# Patient Record
Sex: MALE | Race: WHITE | NOT HISPANIC OR LATINO | Employment: FULL TIME | URBAN - METROPOLITAN AREA
[De-identification: names, ages, dates, MRNs, and addresses within clinical notes are randomized per-mention and may not be internally consistent; named-entity substitution may affect disease eponyms.]

---

## 2020-10-23 ENCOUNTER — OFFICE VISIT (OUTPATIENT)
Dept: FAMILY MEDICINE | Facility: CLINIC | Age: 40
End: 2020-10-23
Payer: COMMERCIAL

## 2020-10-23 ENCOUNTER — TELEPHONE (OUTPATIENT)
Dept: FAMILY MEDICINE | Facility: CLINIC | Age: 40
End: 2020-10-23

## 2020-10-23 ENCOUNTER — OFFICE VISIT (OUTPATIENT)
Dept: URGENT CARE | Facility: CLINIC | Age: 40
End: 2020-10-23
Payer: COMMERCIAL

## 2020-10-23 VITALS
BODY MASS INDEX: 27.05 KG/M2 | RESPIRATION RATE: 18 BRPM | DIASTOLIC BLOOD PRESSURE: 70 MMHG | WEIGHT: 188.94 LBS | TEMPERATURE: 98 F | SYSTOLIC BLOOD PRESSURE: 118 MMHG | HEART RATE: 72 BPM | HEIGHT: 70 IN | OXYGEN SATURATION: 98 %

## 2020-10-23 DIAGNOSIS — J40 BRONCHITIS: Primary | ICD-10-CM

## 2020-10-23 DIAGNOSIS — Z20.9 RECENT EXPOSURE TO COMMUNICABLE DISEASE: ICD-10-CM

## 2020-10-23 PROCEDURE — 99999 PR PBB SHADOW E&M-NEW PATIENT-LVL III: CPT | Mod: PBBFAC,,, | Performed by: FAMILY MEDICINE

## 2020-10-23 PROCEDURE — 96372 PR INJECTION,THERAP/PROPH/DIAG2ST, IM OR SUBCUT: ICD-10-PCS | Mod: S$GLB,,, | Performed by: FAMILY MEDICINE

## 2020-10-23 PROCEDURE — 96372 THER/PROPH/DIAG INJ SC/IM: CPT | Mod: S$GLB,,, | Performed by: FAMILY MEDICINE

## 2020-10-23 PROCEDURE — 99203 OFFICE O/P NEW LOW 30 MIN: CPT | Mod: 25,S$GLB,, | Performed by: FAMILY MEDICINE

## 2020-10-23 PROCEDURE — 99999 PR PBB SHADOW E&M-NEW PATIENT-LVL III: ICD-10-PCS | Mod: PBBFAC,,, | Performed by: FAMILY MEDICINE

## 2020-10-23 PROCEDURE — 99203 PR OFFICE/OUTPT VISIT, NEW, LEVL III, 30-44 MIN: ICD-10-PCS | Mod: 25,S$GLB,, | Performed by: FAMILY MEDICINE

## 2020-10-23 PROCEDURE — 3008F PR BODY MASS INDEX (BMI) DOCUMENTED: ICD-10-PCS | Mod: CPTII,S$GLB,, | Performed by: FAMILY MEDICINE

## 2020-10-23 PROCEDURE — 3008F BODY MASS INDEX DOCD: CPT | Mod: CPTII,S$GLB,, | Performed by: FAMILY MEDICINE

## 2020-10-23 RX ORDER — BENZONATATE 200 MG/1
200 CAPSULE ORAL 3 TIMES DAILY PRN
Qty: 30 CAPSULE | Refills: 0 | Status: SHIPPED | OUTPATIENT
Start: 2020-10-23 | End: 2020-11-02

## 2020-10-23 RX ORDER — METHYLPREDNISOLONE SOD SUCC 125 MG
125 VIAL (EA) INJECTION ONCE
Status: COMPLETED | OUTPATIENT
Start: 2020-10-23 | End: 2020-10-23

## 2020-10-23 RX ORDER — IPRATROPIUM BROMIDE AND ALBUTEROL SULFATE 2.5; .5 MG/3ML; MG/3ML
3 SOLUTION RESPIRATORY (INHALATION)
Status: SHIPPED | OUTPATIENT
Start: 2020-10-23

## 2020-10-23 RX ORDER — AMOXICILLIN AND CLAVULANATE POTASSIUM 875; 125 MG/1; MG/1
1 TABLET, FILM COATED ORAL EVERY 12 HOURS
Qty: 14 TABLET | Refills: 0 | Status: SHIPPED | OUTPATIENT
Start: 2020-10-23 | End: 2020-10-30

## 2020-10-23 RX ADMIN — Medication 125 MG: at 02:10

## 2020-10-23 NOTE — TELEPHONE ENCOUNTER
Pt wanted to know if he need a covid test before coming to the office. Pt states he having sinus  problem that combine with covid symptoms

## 2020-10-23 NOTE — TELEPHONE ENCOUNTER
----- Message from Whitney Holly sent at 10/23/2020 11:29 AM CDT -----  Contact: Patient [phone]  Type: Patient Call Back    Who called:Patient    What is the request in detail: Need to speak to nurse in regards to 2 pm appointment today with Dr Will and COVID Testing he's scheduled too have done before seeing Dr Will. Wondering if he has to get the COVID Test before seeing Dr Will? States he's been there for an hour waiting to get the test done. Wife tested negative. Please call pt ASAP!     Would the patient rather a call back or a response via [facility]? Call back    Best call back number: [phone]

## 2020-10-25 NOTE — PROGRESS NOTES
"Subjective:       Patient ID: Rito Flores is a 40 y.o. male.    Chief Complaint: Cough and Sore Throat    Cough  This is a new problem. The current episode started in the past 7 days. The problem has been gradually worsening. The problem occurs constantly. The cough is productive of sputum. Associated symptoms include postnasal drip, a sore throat and shortness of breath. Pertinent negatives include no chest pain, fever, heartburn, hemoptysis or wheezing.   Sore Throat   Associated symptoms include coughing and shortness of breath.   Wife is a teacher and was recently diagnosed with bronchitis    Review of Systems   Constitutional: Positive for activity change, appetite change and fatigue. Negative for fever.   HENT: Positive for postnasal drip and sore throat.    Respiratory: Positive for cough, chest tightness and shortness of breath. Negative for hemoptysis and wheezing.    Cardiovascular: Negative for chest pain.   Gastrointestinal: Negative for heartburn.         Objective:     /70 (BP Location: Left arm, Patient Position: Sitting, BP Method: Medium (Manual))   Pulse 72   Temp 98.3 °F (36.8 °C) (Oral)   Resp 18   Ht 5' 10" (1.778 m)   Wt 85.7 kg (188 lb 15 oz)   SpO2 98%   BMI 27.11 kg/m²     Physical Exam  Vitals signs reviewed.   Constitutional:       Appearance: He is ill-appearing. He is not toxic-appearing.   HENT:      Right Ear: Tympanic membrane, ear canal and external ear normal.      Left Ear: Tympanic membrane, ear canal and external ear normal.      Nose: Rhinorrhea present.      Mouth/Throat:      Pharynx: Posterior oropharyngeal erythema present. No oropharyngeal exudate.      Tonsils: No tonsillar exudate.   Neck:      Musculoskeletal: Normal range of motion and neck supple.      Thyroid: No thyromegaly.      Trachea: Trachea normal.   Cardiovascular:      Rate and Rhythm: Normal rate and regular rhythm.      Pulses:           Radial pulses are 2+ on the right side and 2+ on the " left side.      Heart sounds: S1 normal and S2 normal.   Pulmonary:      Effort: No tachypnea, accessory muscle usage or respiratory distress.      Breath sounds: Wheezing (scattered) and rhonchi (scattered) present. No rales.   Abdominal:      General: Bowel sounds are normal.      Palpations: Abdomen is soft.      Tenderness: There is no abdominal tenderness.   Lymphadenopathy:      Cervical: No cervical adenopathy.   Skin:     Capillary Refill: Capillary refill takes less than 2 seconds.         Assessment:       1. Bronchitis    2. Recent exposure to communicable disease        Plan:       Rito was seen today for cough and sore throat.    Diagnoses and all orders for this visit:    Bronchitis  -     methylPREDNISolone sodium succinate injection 125 mg  -     albuterol-ipratropium 2.5 mg-0.5 mg/3 mL nebulizer solution 3 mL  -     amoxicillin-clavulanate 875-125mg (AUGMENTIN) 875-125 mg per tablet; Take 1 tablet by mouth every 12 (twelve) hours. for 7 days  -     benzonatate (TESSALON) 200 MG capsule; Take 1 capsule (200 mg total) by mouth 3 (three) times daily as needed for Cough.    Recent exposure to communicable disease    Breath sounds much improved after DuoNeb and IM steroid.   Patient reported feeling better as well.  Continue symptomatic care as well course of antibiotic as prescribed.  Patient to call office if not improved by next week.

## 2020-11-06 DIAGNOSIS — Z11.59 NEED FOR HEPATITIS C SCREENING TEST: ICD-10-CM

## 2021-04-15 ENCOUNTER — PATIENT MESSAGE (OUTPATIENT)
Dept: RESEARCH | Facility: HOSPITAL | Age: 41
End: 2021-04-15

## 2022-05-31 ENCOUNTER — PATIENT MESSAGE (OUTPATIENT)
Dept: ADMINISTRATIVE | Facility: HOSPITAL | Age: 42
End: 2022-05-31
Payer: COMMERCIAL

## 2023-06-15 ENCOUNTER — PATIENT OUTREACH (OUTPATIENT)
Dept: ADMINISTRATIVE | Facility: HOSPITAL | Age: 43
End: 2023-06-15
Payer: COMMERCIAL

## 2024-01-17 ENCOUNTER — OFFICE VISIT (OUTPATIENT)
Dept: OPTOMETRY | Facility: CLINIC | Age: 44
End: 2024-01-17
Payer: COMMERCIAL

## 2024-01-17 DIAGNOSIS — H52.4 ASTIGMATISM OF BOTH EYES WITH PRESBYOPIA: ICD-10-CM

## 2024-01-17 DIAGNOSIS — H01.02A SQUAMOUS BLEPHARITIS OF UPPER AND LOWER EYELIDS OF BOTH EYES: Primary | ICD-10-CM

## 2024-01-17 DIAGNOSIS — H01.02B SQUAMOUS BLEPHARITIS OF UPPER AND LOWER EYELIDS OF BOTH EYES: Primary | ICD-10-CM

## 2024-01-17 DIAGNOSIS — H52.203 ASTIGMATISM OF BOTH EYES WITH PRESBYOPIA: ICD-10-CM

## 2024-01-17 PROCEDURE — 1159F MED LIST DOCD IN RCRD: CPT | Mod: CPTII,S$GLB,, | Performed by: OPTOMETRIST

## 2024-01-17 PROCEDURE — 99999 PR PBB SHADOW E&M-EST. PATIENT-LVL I: CPT | Mod: PBBFAC,,, | Performed by: OPTOMETRIST

## 2024-01-17 PROCEDURE — 1160F RVW MEDS BY RX/DR IN RCRD: CPT | Mod: CPTII,S$GLB,, | Performed by: OPTOMETRIST

## 2024-01-17 PROCEDURE — 92004 COMPRE OPH EXAM NEW PT 1/>: CPT | Mod: S$GLB,,, | Performed by: OPTOMETRIST

## 2024-01-17 PROCEDURE — 92015 DETERMINE REFRACTIVE STATE: CPT | Mod: S$GLB,,, | Performed by: OPTOMETRIST

## 2024-01-17 NOTE — PROGRESS NOTES
LUPILLO GRACE: about 1 yr ago with outside provider   Chief complaint (CC): 44 yo M presents for annual exam. Pt reports change   in near vision of OD. Distance vision not affected. Pt denies any other   ocular or visual complaints today.   Glasses? OTC readers  Contacts? -  H/o eye surgery, injections or laser: -  H/o eye injury: -  Known eye conditions? -  Family h/o eye conditions? -  Eye gtts? -    (-) Flashes (-)  Floaters (-) Mucous   (-)  Tearing (-) Itching (-) Burning   (-) Headaches (-) Eye Pain/discomfort (-) Irritation   (-)  Redness (-) Double vision (+) Blurry vision    Diabetic? -  A1c? -  Last edited by Faby Echeverria, OD on 1/17/2024 12:53 PM.            Assessment /Plan     For exam results, see Encounter Report.    Squamous blepharitis of upper and lower eyelids of both eyes   - Minimal. Monitor annually     Astigmatism of both eyes with presbyopia   - New Spectacle Rx given, discussed different options for glasses.   - RTC 1 year for routine eye exam, or sooner should symptoms persist or worsen after getting new glasses

## 2025-07-09 ENCOUNTER — OFFICE VISIT (OUTPATIENT)
Dept: URGENT CARE | Facility: CLINIC | Age: 45
End: 2025-07-09
Payer: COMMERCIAL

## 2025-07-09 VITALS
HEIGHT: 70 IN | OXYGEN SATURATION: 99 % | RESPIRATION RATE: 18 BRPM | HEART RATE: 89 BPM | DIASTOLIC BLOOD PRESSURE: 91 MMHG | WEIGHT: 188 LBS | BODY MASS INDEX: 26.92 KG/M2 | SYSTOLIC BLOOD PRESSURE: 132 MMHG | TEMPERATURE: 99 F

## 2025-07-09 DIAGNOSIS — M10.9 ACUTE GOUT OF RIGHT KNEE, UNSPECIFIED CAUSE: ICD-10-CM

## 2025-07-09 DIAGNOSIS — L03.115 CELLULITIS OF RIGHT LOWER EXTREMITY: ICD-10-CM

## 2025-07-09 DIAGNOSIS — M25.561 PAIN AND SWELLING OF KNEE, RIGHT: Primary | ICD-10-CM

## 2025-07-09 DIAGNOSIS — M25.461 PAIN AND SWELLING OF KNEE, RIGHT: Primary | ICD-10-CM

## 2025-07-09 LAB — URATE SERPL-MCNC: 4.5 MG/DL (ref 3.4–7)

## 2025-07-09 PROCEDURE — 99214 OFFICE O/P EST MOD 30 MIN: CPT | Mod: 25,S$GLB,, | Performed by: FAMILY MEDICINE

## 2025-07-09 PROCEDURE — 84550 ASSAY OF BLOOD/URIC ACID: CPT | Performed by: FAMILY MEDICINE

## 2025-07-09 PROCEDURE — 96372 THER/PROPH/DIAG INJ SC/IM: CPT | Mod: S$GLB,,, | Performed by: FAMILY MEDICINE

## 2025-07-09 RX ORDER — SULFAMETHOXAZOLE AND TRIMETHOPRIM 800; 160 MG/1; MG/1
1 TABLET ORAL 2 TIMES DAILY
Qty: 14 TABLET | Refills: 0 | Status: SHIPPED | OUTPATIENT
Start: 2025-07-09 | End: 2025-07-16

## 2025-07-09 RX ORDER — DEXAMETHASONE SODIUM PHOSPHATE 10 MG/ML
10 INJECTION INTRAMUSCULAR; INTRAVENOUS
Status: COMPLETED | OUTPATIENT
Start: 2025-07-09 | End: 2025-07-09

## 2025-07-09 RX ORDER — KETOROLAC TROMETHAMINE 30 MG/ML
30 INJECTION, SOLUTION INTRAMUSCULAR; INTRAVENOUS
Status: COMPLETED | OUTPATIENT
Start: 2025-07-09 | End: 2025-07-09

## 2025-07-09 RX ADMIN — KETOROLAC TROMETHAMINE 30 MG: 30 INJECTION, SOLUTION INTRAMUSCULAR; INTRAVENOUS at 11:07

## 2025-07-09 RX ADMIN — DEXAMETHASONE SODIUM PHOSPHATE 10 MG: 10 INJECTION INTRAMUSCULAR; INTRAVENOUS at 11:07

## 2025-07-09 NOTE — PROGRESS NOTES
"Subjective:      Patient ID: Rito Flores is a 45 y.o. male.    Vitals:  height is 5' 10" (1.778 m) and weight is 85.3 kg (188 lb). His oral temperature is 99.2 °F (37.3 °C). His blood pressure is 132/91 (abnormal) and his pulse is 89. His respiration is 18 and oxygen saturation is 99%.     Chief Complaint: Knee Pain    Pt states that he is having right knee pain ,swelling ,red and feverish that started on 7/3. Pt is taking motrin and using ice. Pain level is 6 .    Knee Pain   The incident occurred 5 to 7 days ago. The incident occurred at home. There was no injury mechanism. The pain is present in the right knee. The quality of the pain is described as aching. The pain is at a severity of 6/10. The pain is moderate. The pain has been Constant since onset. The symptoms are aggravated by weight bearing and movement. He has tried ice and NSAIDs for the symptoms. The treatment provided mild relief.       Musculoskeletal:  Positive for pain.   Skin:  Positive for erythema (right knee).      Objective:     Physical Exam   Constitutional: He is oriented to person, place, and time.   HENT:   Head: Normocephalic.   Ears:   Right Ear: External ear normal.   Left Ear: External ear normal.   Nose: Nose normal.   Mouth/Throat: Mucous membranes are moist.   Eyes: Conjunctivae are normal.   Cardiovascular: Normal rate.   Pulmonary/Chest: Effort normal.   Musculoskeletal: Normal range of motion.         General: Swelling (right knee) and tenderness (right knee) present. Normal range of motion.   Neurological: He is alert and oriented to person, place, and time.   Skin: Skin is dry. erythema (right knee)   Psychiatric: His behavior is normal.       Assessment:     1. Pain and swelling of knee, right    2. Acute gout of right knee, unspecified cause    3. Cellulitis of right lower extremity        Plan:       Pain and swelling of knee, right  -     Cancel: URIC ACID; Future; Expected date: 07/09/2025  -     Cancel: CBC W/ AUTO " DIFFERENTIAL; Future; Expected date: 07/09/2025  -     dexAMETHasone injection 10 mg  -     sulfamethoxazole-trimethoprim 800-160mg (BACTRIM DS) 800-160 mg Tab; Take 1 tablet by mouth 2 (two) times daily. for 7 days  Dispense: 14 tablet; Refill: 0  -     ketorolac injection 30 mg    Acute gout of right knee, unspecified cause  -     Cancel: URIC ACID; Future; Expected date: 07/09/2025  -     dexAMETHasone injection 10 mg  -     URIC ACID; Future; Expected date: 07/09/2025    Cellulitis of right lower extremity  -     Cancel: CBC W/ AUTO DIFFERENTIAL; Future; Expected date: 07/09/2025  -     sulfamethoxazole-trimethoprim 800-160mg (BACTRIM DS) 800-160 mg Tab; Take 1 tablet by mouth 2 (two) times daily. for 7 days  Dispense: 14 tablet; Refill: 0  -     CBC W/ AUTO DIFFERENTIAL; Future; Expected date: 07/09/2025      Ddx gout v cellulitis  Will cover for both  Did have fever and chills on the first day, low grade fever today at 99.2. states swelling comes and goes, but the pain was intolerable at some point.   Precautions given for septic joint patient stated understanding.

## 2025-07-11 ENCOUNTER — PATIENT MESSAGE (OUTPATIENT)
Dept: FAMILY MEDICINE | Facility: CLINIC | Age: 45
End: 2025-07-11
Payer: COMMERCIAL

## 2025-07-15 DIAGNOSIS — M25.561 RIGHT KNEE PAIN, UNSPECIFIED CHRONICITY: Primary | ICD-10-CM

## 2025-07-16 ENCOUNTER — APPOINTMENT (OUTPATIENT)
Dept: RADIOLOGY | Facility: HOSPITAL | Age: 45
End: 2025-07-16
Payer: COMMERCIAL

## 2025-07-16 ENCOUNTER — OFFICE VISIT (OUTPATIENT)
Dept: ORTHOPEDICS | Facility: CLINIC | Age: 45
End: 2025-07-16
Payer: COMMERCIAL

## 2025-07-16 VITALS — WEIGHT: 188.06 LBS | BODY MASS INDEX: 26.92 KG/M2 | HEIGHT: 70 IN

## 2025-07-16 DIAGNOSIS — M70.41 PREPATELLAR BURSITIS OF RIGHT KNEE: ICD-10-CM

## 2025-07-16 DIAGNOSIS — M25.561 ACUTE PAIN OF RIGHT KNEE: Primary | ICD-10-CM

## 2025-07-16 DIAGNOSIS — M25.561 RIGHT KNEE PAIN, UNSPECIFIED CHRONICITY: ICD-10-CM

## 2025-07-16 LAB
APPEARANCE FLD: NORMAL
COLOR FLD: NORMAL
CRYSTAL, BODY FLUID (OHS): NORMAL
GRAM STN SPEC: NORMAL
GRAM STN SPEC: NORMAL
LYMPHOCYTES NFR FLD MANUAL: 14 %
MONOS+MACROS NFR FLD MANUAL: 2 %
NEUTROPHILS NFR FLD MANUAL: 84 %
WBC # FLD: 3129 /CU MM

## 2025-07-16 PROCEDURE — 99999 PR PBB SHADOW E&M-EST. PATIENT-LVL III: CPT | Mod: PBBFAC,,,

## 2025-07-16 PROCEDURE — 87070 CULTURE OTHR SPECIMN AEROBIC: CPT

## 2025-07-16 PROCEDURE — 3008F BODY MASS INDEX DOCD: CPT | Mod: CPTII,S$GLB,,

## 2025-07-16 PROCEDURE — 87206 SMEAR FLUORESCENT/ACID STAI: CPT

## 2025-07-16 PROCEDURE — 1160F RVW MEDS BY RX/DR IN RCRD: CPT | Mod: CPTII,S$GLB,,

## 2025-07-16 PROCEDURE — 87116 MYCOBACTERIA CULTURE: CPT

## 2025-07-16 PROCEDURE — 99204 OFFICE O/P NEW MOD 45 MIN: CPT | Mod: 25,S$GLB,,

## 2025-07-16 PROCEDURE — 73564 X-RAY EXAM KNEE 4 OR MORE: CPT | Mod: TC,FY,PN,RT

## 2025-07-16 PROCEDURE — 89051 BODY FLUID CELL COUNT: CPT

## 2025-07-16 PROCEDURE — 87102 FUNGUS ISOLATION CULTURE: CPT

## 2025-07-16 PROCEDURE — 73564 X-RAY EXAM KNEE 4 OR MORE: CPT | Mod: 26,RT,, | Performed by: RADIOLOGY

## 2025-07-16 PROCEDURE — 87075 CULTR BACTERIA EXCEPT BLOOD: CPT

## 2025-07-16 PROCEDURE — 89060 EXAM SYNOVIAL FLUID CRYSTALS: CPT

## 2025-07-16 PROCEDURE — 20610 DRAIN/INJ JOINT/BURSA W/O US: CPT | Mod: RT,S$GLB,,

## 2025-07-16 PROCEDURE — 87205 SMEAR GRAM STAIN: CPT

## 2025-07-16 PROCEDURE — 1159F MED LIST DOCD IN RCRD: CPT | Mod: CPTII,S$GLB,,

## 2025-07-16 RX ORDER — MELOXICAM 15 MG/1
15 TABLET ORAL DAILY
Qty: 30 TABLET | Refills: 0 | Status: SHIPPED | OUTPATIENT
Start: 2025-07-16

## 2025-07-16 NOTE — PROGRESS NOTES
NEW PATIENT ORTHOPAEDIC: Knee    PRIMARY CARE PHYSICIAN: Simon Will Jr., MD   REFERRING PROVIDER: Siobhan Velasquez PA-C  [address]  [place] [address]     ASSESSMENT & PLAN:    Impression:  Right Knee pre-patellar bursitis     Follow Up Plan: 1 week     Non operative care:    Rito Flores has physical exam evidence of above and wishes to pursue an non-operative care. We discussed multiple treatment modalities today including rest, ice, compression, activity modification, oral medications, aspiration, and others. Patient was seen with Supervising Physician, Dr. Lion. I am recommending the following:   Continue course of antibiotics  Okay to continue OTC NSAIDs  Aspiration of pre-patellar bursa performed today. See procedure note. Aspirated roughly 45cc of blood-tinged fluid. Will send off fluid for cultures.   Compression, ice, elevation   RTC in 1 week for clinical re-evaluation. Will follow culture results.  Call in the interim with any questions     All questions were answered and patient verbalized understanding of the plan.    The patient has been ordered:  In office prepatellar bursa aspiration. Fluid sent for cultures       CONSULTS:   None    ACTIVE PROBLEM LIST  Problem List[1]        SUBJECTIVE    CHIEF COMPLAINT: Knee Pain    Initial Visit 7/16/25 HPI:   Rito Flores is a 45 y.o. male here for evaluation and management of right knee pain. There is a specific incident that brought about this pain. Patient was working in his attic on his knees 2-3 days prior to symptom onset.  Was seen at  for similar symptoms on 7/9/2025 and treated empirically for cellulitis and gout. Uric acid normal. Was on bactrim. Then presented to [facility] ED on 7/11/25 for ongoing symptoms. Labs revealed nml WBC, nml renal function, nml lactic acid. Was transitioned to clindamycin. Now he presents for outpatient orthopedic follow up for ongoing symptoms.      Currently the pain in the joint is rated at mild with  "activity. The pain is constant and is located in the knee, located anterior and without radiation. The pain is described as aching. Relieving factors include ice or heat and prescription medication.       There is not associated Catching, Clicking, and Popping.     Rito Flores has no additional complaints.     PROGRESSIVE SYMPTOMS:  Pain effecting living situation      PREVIOUS TREATMENTS:  Medical: OTC NSAIDS, antibiotics   Physical Therapy: None  Previous Orthopaedic Surgery: none    REVIEW OF SYSTEMS:  PAIN ASSESSMENT:  See HPI.  MUSCULOSKELETAL: See HPI.  OTHER 10 point review of systems is negative except as stated in HPI above    PAST MEDICAL HISTORY   has no past medical history on file.     PAST SURGICAL HISTORY   has a past surgical history that includes No past surgeries.     FAMILY HISTORY  family history is not on file.     SOCIAL HISTORY   reports that he has never smoked. He does not have any smokeless tobacco history on file. He reports that he does not drink alcohol.     ALLERGIES   Review of patient's allergies indicates:  No Known Allergies     MEDICATIONS  Medications Ordered Prior to Encounter[2]       PHYSICAL EXAM   height is 5' 10" (1.778 m) and weight is 85.3 kg (188 lb 0.8 oz).   Body mass index is 26.98 kg/m².      All other systems deferred.  GENERAL:  No acute distress  HABITUS: Normal  GAIT: Non-antalgic  SKIN: Significant swelling present prepatellar bursa    KNEE EXAM:    left:    TTP: Yes over prepatellar bursa  No crepitus with passive knee ROM  Passive ROM: Extension 0, Flexion 120*  No pain with manipulation of patella  Stable to varus/valgus stress. No increased laxity to anterior/posterior drawer testing  negative Niko's test  No pain with IR/ER rotation of the hip  5/5 strength in knee flexion and extension, ankle plantarflexion and dorsiflexion  Neurovascular Status: Sensation intact to light touch in Sural, Saphenous, SPN, DPN, Tibial nerve distribution  2+ pulse DP/PT, " normal capillary refill, foot has normal warmth    DATA:  Diagnostic tests reviewed for today's visit:     4v of the knee reveal relatively well-maintained joint spaces. No acute fracture or dislocation. Significant soft tissue swelling noted anterior to the knee joint.            [1] There is no problem list on file for this patient.  [2]   Current Outpatient Medications on File Prior to Visit   Medication Sig Dispense Refill    sulfamethoxazole-trimethoprim 800-160mg (BACTRIM DS) 800-160 mg Tab Take 1 tablet by mouth 2 (two) times daily. for 7 days 14 tablet 0     Current Facility-Administered Medications on File Prior to Visit   Medication Dose Route Frequency Provider Last Rate Last Admin    albuterol-ipratropium 2.5 mg-0.5 mg/3 mL nebulizer solution 3 mL  3 mL Nebulization 1 time in Clinic/HOD Simon Will Jr., MD

## 2025-07-16 NOTE — PROCEDURES
Large Joint Aspiration/Injection: R patellar bursa    Date/Time: 7/16/2025 10:30 AM    Performed by: Siobhan Velasquez PA-C  Authorized by: Siobhan Velasquez PA-C    Consent Done?:  Yes (Verbal)  Indications:  Pain  Prep: patient was prepped and draped in usual sterile fashion      Local anesthesia used?: Yes    Local anesthetic:  Topical anesthetic    Details:  Needle Size:  22 G  Ultrasonic Guidance for needle placement?: No    Approach:  Lateral  Location:  Knee  Site:  R patellar bursa  Aspirate amount (mL):  45  Aspirate:  Blood-tinged and cloudy  Lab: fluid sent for laboratory analysis    Patient tolerance:  Patient tolerated the procedure well with no immediate complications

## 2025-07-18 LAB
ACID FAST MOD KINY STN SPEC: NORMAL
BACTERIA SPEC ANAEROBE CULT: NORMAL

## 2025-07-19 LAB — BACTERIA SPEC AEROBE CULT: NORMAL

## 2025-07-23 ENCOUNTER — OFFICE VISIT (OUTPATIENT)
Dept: ORTHOPEDICS | Facility: CLINIC | Age: 45
End: 2025-07-23
Payer: COMMERCIAL

## 2025-07-23 VITALS — WEIGHT: 188.06 LBS | BODY MASS INDEX: 26.92 KG/M2 | HEIGHT: 70 IN

## 2025-07-23 DIAGNOSIS — M25.561 ACUTE PAIN OF RIGHT KNEE: ICD-10-CM

## 2025-07-23 DIAGNOSIS — M70.41 PREPATELLAR BURSITIS OF RIGHT KNEE: Primary | ICD-10-CM

## 2025-07-23 PROCEDURE — 3008F BODY MASS INDEX DOCD: CPT | Mod: CPTII,S$GLB,,

## 2025-07-23 PROCEDURE — 99214 OFFICE O/P EST MOD 30 MIN: CPT | Mod: S$GLB,,,

## 2025-07-23 PROCEDURE — 99999 PR PBB SHADOW E&M-EST. PATIENT-LVL III: CPT | Mod: PBBFAC,,,

## 2025-07-23 PROCEDURE — 1160F RVW MEDS BY RX/DR IN RCRD: CPT | Mod: CPTII,S$GLB,,

## 2025-07-23 PROCEDURE — 1159F MED LIST DOCD IN RCRD: CPT | Mod: CPTII,S$GLB,,

## 2025-07-23 NOTE — PROGRESS NOTES
EST PATIENT ORTHOPAEDIC: Knee    PRIMARY CARE PHYSICIAN: Simon Will Jr., MD   REFERRING PROVIDER: No referring provider defined for this encounter.     ASSESSMENT & PLAN:    Impression:  Right Knee pre-patellar bursitis     Follow Up Plan: 2 weeks     Non operative care:    Rito Flores has physical exam evidence of above and wishes to pursue an non-operative care. We discussed multiple treatment modalities today including rest, ice, compression, activity modification, oral medications, aspiration, and others. Patient's case was discussed with Supervising Physician, Dr. Lion. I am recommending the following:   Continue meloxicam   Compression, ice, elevation for residual swelling   NGTD on cultures  RTC in 2 weeks for clinically re-evaluation. Advised him to let us know if knee becomes more painful, red, or swollen.  Call in the interim with any questions     All questions were answered and patient verbalized understanding of the plan.  [facility]       ACTIVE PROBLEM LIST  Problem List[1]        SUBJECTIVE    CHIEF COMPLAINT: Knee Pain    Initial Visit 7/16/25 HPI:   Rito Flores is a 45 y.o. male here for evaluation and management of right knee pain. There is a specific incident that brought about this pain. Patient was working in his attic on his knees 2-3 days prior to symptom onset.  Was seen at  for similar symptoms on 7/9/2025 and treated empirically for cellulitis and gout. Uric acid normal. Was on bactrim. Then presented to [facility] ED on 7/11/25 for ongoing symptoms. Labs revealed nml WBC, nml renal function, nml lactic acid. Was transitioned to clindamycin. Now he presents for outpatient orthopedic follow up for ongoing symptoms.      Currently the pain in the joint is rated at mild with activity. The pain is constant and is located in the knee, located anterior and without radiation. The pain is described as aching. Relieving factors include ice or heat and  "prescription medication.       There is not associated Catching, Clicking, and Popping.     Interval history 7/23/25: Patient returns today for 1 week follow up of right knee. Completed course of clinda. After draining, he reports swelling returned about 1-2 days later. No longer utilizing compression. No increased erythema. Pain has completely resolved. No pain with ambulation or palpation. He has been taking meloxicam. He does think the swelling has improved slightly since yesterday. Able to fully flex the knee without pain.     Rito Flores has no additional complaints.     PROGRESSIVE SYMPTOMS:  Pain effecting living situation      PREVIOUS TREATMENTS:  Medical: OTC NSAIDS, antibiotics   Physical Therapy: None  Previous Orthopaedic Surgery: none    REVIEW OF SYSTEMS:  PAIN ASSESSMENT:  See HPI.  MUSCULOSKELETAL: See HPI.  OTHER 10 point review of systems is negative except as stated in HPI above    PAST MEDICAL HISTORY   has no past medical history on file.     PAST SURGICAL HISTORY   has a past surgical history that includes No past surgeries.     FAMILY HISTORY  family history is not on file.     SOCIAL HISTORY   reports that he has never smoked. He does not have any smokeless tobacco history on file. He reports that he does not drink alcohol.     ALLERGIES   Review of patient's allergies indicates:  No Known Allergies     MEDICATIONS  Medications Ordered Prior to Encounter[2]       PHYSICAL EXAM   height is 5' 10" (1.778 m) and weight is 85.3 kg (188 lb 0.8 oz).   Body mass index is 26.98 kg/m².      All other systems deferred.  GENERAL:  No acute distress  HABITUS: Normal  GAIT: Non-antalgic  SKIN: Significant swelling present prepatellar bursa    KNEE EXAM:    left:    TTP: No over prepatellar bursa  No crepitus with passive knee ROM  Passive ROM: Extension 0, Flexion 130  No pain with manipulation of patella  Stable to varus/valgus stress. No increased laxity to anterior/posterior drawer testing  negative " Niko's test  No pain with IR/ER rotation of the hip  5/5 strength in knee flexion and extension, ankle plantarflexion and dorsiflexion  Neurovascular Status: Sensation intact to light touch in Sural, Saphenous, SPN, DPN, Tibial nerve distribution  2+ pulse DP/PT, normal capillary refill, foot has normal warmth    DATA:  Diagnostic tests reviewed for today's visit:     4v of the knee reveal relatively well-maintained joint spaces. No acute fracture or dislocation. Significant soft tissue swelling noted anterior to the knee joint.     Labs  WBC cell count 3,129   NGTD on cultures   No crystals              [1] There is no problem list on file for this patient.   [2]   Current Outpatient Medications on File Prior to Visit   Medication Sig Dispense Refill    meloxicam (MOBIC) 15 MG tablet Take 1 tablet (15 mg total) by mouth once daily. 30 tablet 0     Current Facility-Administered Medications on File Prior to Visit   Medication Dose Route Frequency Provider Last Rate Last Admin    albuterol-ipratropium 2.5 mg-0.5 mg/3 mL nebulizer solution 3 mL  3 mL Nebulization 1 time in Clinic/HOD Simon Will Jr., MD